# Patient Record
Sex: FEMALE | Race: BLACK OR AFRICAN AMERICAN | Employment: UNEMPLOYED | URBAN - METROPOLITAN AREA
[De-identification: names, ages, dates, MRNs, and addresses within clinical notes are randomized per-mention and may not be internally consistent; named-entity substitution may affect disease eponyms.]

---

## 2017-10-22 PROBLEM — Z79.899 POLYPHARMACY: Status: ACTIVE | Noted: 2017-10-22

## 2017-10-22 PROBLEM — R41.82 ALTERED MENTAL STATUS: Status: ACTIVE | Noted: 2017-10-22

## 2017-10-22 PROBLEM — R09.02 HYPOXIA: Status: ACTIVE | Noted: 2017-10-22

## 2017-10-27 PROBLEM — R31.0 GROSS HEMATURIA: Status: ACTIVE | Noted: 2017-10-27

## 2017-10-27 PROBLEM — N20.0 RENAL CALCULUS, RIGHT: Status: ACTIVE | Noted: 2017-10-27

## 2017-12-14 PROBLEM — T42.4X2A BENZODIAZEPINE OVERDOSE, INTENTIONAL SELF-HARM, INITIAL ENCOUNTER (HCC): Status: ACTIVE | Noted: 2017-12-14

## 2017-12-18 ENCOUNTER — HOSPITAL ENCOUNTER (INPATIENT)
Age: 52
LOS: 1 days | Discharge: LEFT BEFORE DISCHARGE | DRG: 885 | End: 2017-12-19
Attending: STUDENT IN AN ORGANIZED HEALTH CARE EDUCATION/TRAINING PROGRAM | Admitting: STUDENT IN AN ORGANIZED HEALTH CARE EDUCATION/TRAINING PROGRAM
Payer: MEDICARE

## 2017-12-18 PROBLEM — F32.A DEPRESSION: Status: ACTIVE | Noted: 2017-12-18

## 2017-12-18 PROBLEM — R45.89 SUICIDAL BEHAVIOR: Status: ACTIVE | Noted: 2017-12-18

## 2017-12-18 LAB
ANION GAP SERPL CALC-SCNC: 9 MMOL/L (ref 3–18)
BUN SERPL-MCNC: 12 MG/DL (ref 7–18)
BUN/CREAT SERPL: 20 (ref 12–20)
CALCIUM SERPL-MCNC: 8.5 MG/DL (ref 8.5–10.1)
CHLORIDE SERPL-SCNC: 106 MMOL/L (ref 100–108)
CO2 SERPL-SCNC: 25 MMOL/L (ref 21–32)
CREAT SERPL-MCNC: 0.59 MG/DL (ref 0.6–1.3)
GLUCOSE BLD STRIP.AUTO-MCNC: 126 MG/DL (ref 70–110)
GLUCOSE SERPL-MCNC: 122 MG/DL (ref 74–99)
POTASSIUM SERPL-SCNC: 4 MMOL/L (ref 3.5–5.5)
SODIUM SERPL-SCNC: 140 MMOL/L (ref 136–145)

## 2017-12-18 PROCEDURE — 65220000003 HC RM SEMIPRIVATE PSYCH

## 2017-12-18 PROCEDURE — 80048 BASIC METABOLIC PNL TOTAL CA: CPT | Performed by: STUDENT IN AN ORGANIZED HEALTH CARE EDUCATION/TRAINING PROGRAM

## 2017-12-18 PROCEDURE — 74011250637 HC RX REV CODE- 250/637: Performed by: STUDENT IN AN ORGANIZED HEALTH CARE EDUCATION/TRAINING PROGRAM

## 2017-12-18 PROCEDURE — 82962 GLUCOSE BLOOD TEST: CPT

## 2017-12-18 PROCEDURE — 36415 COLL VENOUS BLD VENIPUNCTURE: CPT | Performed by: STUDENT IN AN ORGANIZED HEALTH CARE EDUCATION/TRAINING PROGRAM

## 2017-12-18 RX ORDER — LORAZEPAM 2 MG/ML
1-2 INJECTION INTRAMUSCULAR
Status: DISCONTINUED | OUTPATIENT
Start: 2017-12-18 | End: 2017-12-19 | Stop reason: HOSPADM

## 2017-12-18 RX ORDER — INSULIN LISPRO 100 [IU]/ML
INJECTION, SOLUTION INTRAVENOUS; SUBCUTANEOUS
Status: DISCONTINUED | OUTPATIENT
Start: 2017-12-18 | End: 2017-12-19 | Stop reason: HOSPADM

## 2017-12-18 RX ORDER — TRAZODONE HYDROCHLORIDE 50 MG/1
50 TABLET ORAL
Status: DISCONTINUED | OUTPATIENT
Start: 2017-12-18 | End: 2017-12-19 | Stop reason: HOSPADM

## 2017-12-18 RX ORDER — MAGNESIUM SULFATE 100 %
16 CRYSTALS MISCELLANEOUS AS NEEDED
Status: DISCONTINUED | OUTPATIENT
Start: 2017-12-18 | End: 2017-12-19 | Stop reason: HOSPADM

## 2017-12-18 RX ORDER — IBUPROFEN 600 MG/1
600 TABLET ORAL
Status: DISCONTINUED | OUTPATIENT
Start: 2017-12-18 | End: 2017-12-19 | Stop reason: HOSPADM

## 2017-12-18 RX ORDER — HALOPERIDOL 5 MG/1
5 TABLET ORAL
Status: DISCONTINUED | OUTPATIENT
Start: 2017-12-18 | End: 2017-12-19 | Stop reason: HOSPADM

## 2017-12-18 RX ORDER — IBUPROFEN 200 MG
1 TABLET ORAL DAILY
Status: DISCONTINUED | OUTPATIENT
Start: 2017-12-19 | End: 2017-12-18

## 2017-12-18 RX ORDER — LORAZEPAM 1 MG/1
1-2 TABLET ORAL
Status: DISCONTINUED | OUTPATIENT
Start: 2017-12-18 | End: 2017-12-19 | Stop reason: HOSPADM

## 2017-12-18 RX ORDER — IBUPROFEN 200 MG
1 TABLET ORAL DAILY
Status: DISCONTINUED | OUTPATIENT
Start: 2017-12-18 | End: 2017-12-19 | Stop reason: HOSPADM

## 2017-12-18 RX ORDER — HALOPERIDOL 5 MG/ML
5 INJECTION INTRAMUSCULAR
Status: DISCONTINUED | OUTPATIENT
Start: 2017-12-18 | End: 2017-12-19 | Stop reason: HOSPADM

## 2017-12-18 RX ADMIN — IBUPROFEN 600 MG: 600 TABLET, FILM COATED ORAL at 21:02

## 2017-12-18 RX ADMIN — TRAZODONE HYDROCHLORIDE 50 MG: 50 TABLET ORAL at 21:04

## 2017-12-18 NOTE — IP AVS SNAPSHOT
[address] [place] [address] Patient: Annie Gayle MRN: [id number] IWD:5/50/8537 About your hospitalization You were admitted on:  December 18, 2017 You last received care in the:  [facility] 1 ADULT CHEM DEP You were discharged on:  December 19, 2017 Why you were hospitalized Your primary diagnosis was:  Not on File Your diagnoses also included:  Suicidal Behavior, Depression Things You Need To Do (next 8 weeks) Follow up with Where:  Pt left AMA. Pt encouraged to schedule follow up with provider of her choice Discharge Orders None A check adele indicates which time of day the medication should be taken. My Medications ASK your physician about these medications Instructions Each Dose to Equal  
 Morning Noon Evening Bedtime BD INSULIN SYRINGE ULTRA-FINE 1/2 mL 31 gauge x 15/64\" Syrg Generic drug:  insulin syringe-needle U-100 Your last dose was: Your next dose is:    
   
   
      
   
   
   
  
 ergocalciferol 50,000 unit capsule Commonly known as:  ERGOCALCIFEROL Start taking on:  12/23/2017 Your last dose was: Your next dose is: Take 1 Cap by mouth Every Saturday. [id number] Units  
    
   
   
   
  
 ibuprofen 600 mg tablet Commonly known as:  MOTRIN Your last dose was: Your next dose is: Take 1 Tab by mouth every six (6) hours as needed for Pain. 600 mg JANUVIA 100 mg tablet Generic drug:  SITagliptin Your last dose was: Your next dose is:    
   
   
      
   
   
   
  
 JARDIANCE 25 mg tablet Generic drug:  empagliflozin Your last dose was: Your next dose is:    
   
   
 take 1 tablet by mouth once daily  
     
   
   
   
  
 losartan 50 mg tablet Commonly known as:  COZAAR Your last dose was: Your next dose is:    
   
   
      
   
   
   
  
 magnesium oxide 400 mg tablet Commonly known as:  MAG-OX Your last dose was: Your next dose is: Take 1 Tab by mouth daily. 400 mg  
    
   
   
   
  
 metFORMIN 1,000 mg tablet Commonly known as:  GLUCOPHAGE Your last dose was: Your next dose is:    
   
   
      
   
   
   
  
 nicotine 21 mg/24 hr  
Commonly known as:  Lexa Montane Your last dose was: Your next dose is:    
   
   
 1 Patch by TransDERmal route daily for 30 days. 1 Patch  
    
   
   
   
  
 omeprazole 40 mg capsule Commonly known as:  PRILOSEC Your last dose was: Your next dose is:    
   
   
      
   
   
   
  
 potassium chloride 20 mEq tablet Commonly known as:  K-DUR, KLOR-CON Your last dose was: Your next dose is: Take 1 Tab by mouth daily. 20 mEq QUEtiapine 300 mg tablet Commonly known as:  SEROquel Your last dose was: Your next dose is:    
   
   
 take 2 tablets by mouth at bedtime  
     
   
   
   
  
 simvastatin 40 mg tablet Commonly known as:  ZOCOR Your last dose was: Your next dose is:    
   
   
      
   
   
   
  
 ziprasidone 80 mg capsule Commonly known as:  Mague Masterson Your last dose was: Your next dose is:    
   
   
 take 2 capsules by mouth at bedtime with food Discharge Instructions Pt insisted on leaving treatment and found not detainable by Evaluator from [facility]. No prescriptions provided rt AMA status. [email or web address] Announcement We are excited to announce that we are making your provider's discharge notes available to you in [employer]. You will see these notes when they are completed and signed by the physician that discharged you from your recent hospital stay.   If you have any questions or concerns about any information you see in [employer], please call the Health Information Department where you were seen or reach out to your Primary Care Provider for more information about your plan of care. Introducing [facility]! Dear Wendy Higgins: Thank you for requesting a [employer] account. Our records indicate that you already have an active [employer] account. You can access your account anytime at [email or web address] Did you know that you can access your hospital and ER discharge instructions at any time in [employer]? You can also review all of your test results from your hospital stay or ER visit. Additional Information If you have questions, please visit the Frequently Asked Questions section of the [employer] website at [email or web address] Remember, [employer] is NOT to be used for urgent needs. For medical emergencies, dial 911. Now available from your iPhone and Android! Providers Seen During Your Hospitalization Provider Specialty Primary office phone Darek Ward MD Psychiatry [phone] Your Primary Care Physician (PCP) Primary Care Physician Office Phone Office Fax Aileen Bowles [phone] You are allergic to the following Allergen Reactions Penicillins Swelling Recent Documentation Height Weight BMI Smoking Status 1.702 m 104.3 kg 36.02 kg/m2 Current Every Day Smoker Emergency Contacts Name Discharge Info Relation Home Work Mobile TamBarry DISCHARGE CAREGIVER [3] Spouse [3] [phone] Patient Belongings The following personal items are in your possession at time of discharge: 
  Dental Appliances: None  Visual Aid: None      Home Medications: None   Jewelry: None  Clothing: Jacket/Coat, Pants, Shirt, Footwear    Other Valuables: Wallet, Purse (locker 127/2)  Personal Items Sent to Safe: yes (two bank of va cards) Please provide this summary of care documentation to your next provider. Signatures-by signing, you are acknowledging that this After Visit Summary has been reviewed with you and you have received a copy. Patient Signature:  ____________________________________________________________ Date:  ____________________________________________________________  
  
Paulene Greener Provider Signature:  ____________________________________________________________ Date:  ____________________________________________________________

## 2017-12-18 NOTE — IP AVS SNAPSHOT
[address] [facility] Patient: Annie Gayle MRN: [id number] WBB:5/50/[birth year]43 My Medications ASK your physician about these medications Instructions Each Dose to Equal  
 Morning Noon Evening Bedtime BD INSULIN SYRINGE ULTRA-FINE 1/2 mL 31 gauge x 15/64\" Syrg Generic drug:  insulin syringe-needle U-100 Your last dose was: Your next dose is:    
   
   
      
   
   
   
  
 ergocalciferol 50,000 unit capsule Commonly known as:  ERGOCALCIFEROL Start taking on:  12/23/2017 Your last dose was: Your next dose is: Take 1 Cap by mouth Every Saturday. 18389 Units  
    
   
   
   
  
 ibuprofen 600 mg tablet Commonly known as:  MOTRIN Your last dose was: Your next dose is: Take 1 Tab by mouth every six (6) hours as needed for Pain. 600 mg JANUVIA 100 mg tablet Generic drug:  SITagliptin Your last dose was: Your next dose is:    
   
   
      
   
   
   
  
 JARDIANCE 25 mg tablet Generic drug:  empagliflozin Your last dose was: Your next dose is:    
   
   
 take 1 tablet by mouth once daily  
     
   
   
   
  
 losartan 50 mg tablet Commonly known as:  COZAAR Your last dose was: Your next dose is:    
   
   
      
   
   
   
  
 magnesium oxide 400 mg tablet Commonly known as:  MAG-OX Your last dose was: Your next dose is: Take 1 Tab by mouth daily. 400 mg  
    
   
   
   
  
 metFORMIN 1,000 mg tablet Commonly known as:  GLUCOPHAGE Your last dose was: Your next dose is:    
   
   
      
   
   
   
  
 nicotine 21 mg/24 hr  
Commonly known as:  Barshiraz Sarsofiyaius Your last dose was: Your next dose is:    
   
   
 1 Patch by TransDERmal route daily for 30 days. 1 Patch omeprazole 40 mg capsule Commonly known as:  PRILOSEC Your last dose was: Your next dose is:    
   
   
      
   
   
   
  
 potassium chloride 20 mEq tablet Commonly known as:  K-DUR, KLOR-WILLIAM Your last dose was: Your next dose is: Take 1 Tab by mouth daily. 20 mEq QUEtiapine 300 mg tablet Commonly known as:  SEROquel Your last dose was: Your next dose is:    
   
   
 take 2 tablets by mouth at bedtime  
     
   
   
   
  
 simvastatin 40 mg tablet Commonly known as:  ZOCOR Your last dose was: Your next dose is:    
   
   
      
   
   
   
  
 ziprasidone 80 mg capsule Commonly known as:  Stefano Vo Your last dose was: Your next dose is:    
   
   
 take 2 capsules by mouth at bedtime with food

## 2017-12-18 NOTE — BSMART NOTE
COMPREHENSIVE ASSESSMENT FORM PART 1    SECTION I - DISPOSITION    At [address] hours - Spoke with Terri Alonzo RN assigned to patient on [address] Unit, Room 2466 ([phone]). At [address] hours - Spoke with Rojelio Cal [facility]  ([phone], Pager). The plan is to transfer the patient from the [address] [place] [address] Unit to [facility] Room #094-70 on the Adult/Chemical Dependency Unit. The unit phone is ext. [phone]. Terri Alonzo, the assigned RN to this patient as [facility] has been instructed to have patient transported to the [facility] ER, if patient will be arriving to [facility] [place] after 7pm.    The on-call Psychiatrist consulted was Dr. Brady Doss. The accepting Psychiatrist will be Dr. Mu Bazzi. [facility] unit admitting diagnosis is:  Depression. Suicidal Behavior (Overdose of Lorazepam/Ativan).         ADMISSION ORDERS:  *Routine PRN admission orders  *Sliding scale insulin  *POC Glucose  *Nicoderm Patch  * Creatine Kinase (CK) lab:  Result on 12/18/17 = 352 (Range:   U/L)        Letty Hagen RN, BSN

## 2017-12-19 VITALS
BODY MASS INDEX: 36.1 KG/M2 | HEIGHT: 67 IN | DIASTOLIC BLOOD PRESSURE: 94 MMHG | WEIGHT: 230 LBS | SYSTOLIC BLOOD PRESSURE: 139 MMHG | TEMPERATURE: 99.2 F | RESPIRATION RATE: 18 BRPM | HEART RATE: 88 BPM

## 2017-12-19 LAB — GLUCOSE BLD STRIP.AUTO-MCNC: 158 MG/DL (ref 70–110)

## 2017-12-19 PROCEDURE — 74011250637 HC RX REV CODE- 250/637: Performed by: STUDENT IN AN ORGANIZED HEALTH CARE EDUCATION/TRAINING PROGRAM

## 2017-12-19 PROCEDURE — 82962 GLUCOSE BLOOD TEST: CPT

## 2017-12-19 PROCEDURE — 74011636637 HC RX REV CODE- 636/637: Performed by: STUDENT IN AN ORGANIZED HEALTH CARE EDUCATION/TRAINING PROGRAM

## 2017-12-19 RX ADMIN — INSULIN LISPRO 2 UNITS: 100 INJECTION, SOLUTION INTRAVENOUS; SUBCUTANEOUS at 08:29

## 2017-12-19 RX ADMIN — IBUPROFEN 600 MG: 600 TABLET, FILM COATED ORAL at 08:55

## 2017-12-19 NOTE — BH NOTES
GROUP THERAPY PROGRESS NOTE    Stacy Ugalde is participating in [place]. Group time: 1 hour    Personal goal for participation: rules/ regulations    Goal orientation: community    Group therapy participation: minimal    Therapeutic interventions reviewed and discussed: She was not a management problem during group. Impression of participation: The above pt was quiet during group she was receptive during this group with the rules and regulations during group.

## 2017-12-19 NOTE — BH NOTES
Pt discharged to care of self AMA. Pt denies SI. Pt encouraged to follow up with provider of her choice. After care instructions reviewed with pt who verbalized understanding. Belongings returned.

## 2017-12-19 NOTE — H&P
History and Physical        Patient: Ahskan Rayo               Sex: female          DOA: 12/18/2017         YOB: 1965      Age:  45 y.o.        LOS:  LOS: 1 day        HPI:     Ashkan Rayo is a 45 y.o. female who was admitted experiencing depression, and suicidal ideation with a plan to overdose using medications. Active Problems:    Suicidal behavior (12/18/2017)      Depression (12/18/2017)        Past Medical History:   Diagnosis Date    Bipolar 2 disorder ([facility]     CAD (coronary artery disease)     Diabetes ([facility]     Drug overdose     GERD (gastroesophageal reflux disease)     Hematuria     Hyperlipemia     Insomnia     Kidney stone        No past surgical history on file. Family History   Problem Relation Age of Onset    Cancer Maternal Grandmother      cervical       Social History     Social History    Marital status:      Spouse name: N/A    Number of children: Refused to answer    Years of education: Refused to answer     Social History Main Topics    Smoking status: Current Every Day Smoker     Packs/day: 0.10     Years: 20.00     Types: Cigarettes    Smokeless tobacco: Never Used      Comment: 3 cig per day. will stop dec 25th 2017     Alcohol use No    Drug use: No    Sexual activity: Yes     Partners: Female     Birth control/ protection: None     Other Topics Concern     Service No    Blood Transfusions No    Caffeine Concern No    Occupational Exposure No    Hobby Hazards No    Sleep Concern No    Stress Concern No    Weight Concern No    Special Diet Yes    Back Care No    Exercise No    Bike Helmet No    Seat Belt Yes    Self-Exams No     Social History Narrative       Prior to Admission medications    Medication Sig Start Date End Date Taking? Authorizing Provider   nicotine (NICODERM CQ) 21 mg/24 hr 1 Patch by TransDERmal route daily for 30 days.  12/19/17 1/18/18 Yes Ruffus Paget, MD   ibuprofen (MOTRIN) 600 mg tablet Take 1 Tab by mouth every six (6) hours as needed for Pain. 12/11/17  Yes Gregory Go PA-C   JARDIANCE 25 mg tablet take 1 tablet by mouth once daily 9/7/17  Yes Historical Provider   omeprazole (PRILOSEC) 40 mg capsule  8/22/17  Yes Historical Provider   metFORMIN (GLUCOPHAGE) 1,000 mg tablet  7/27/17  Yes Historical Provider   JANUVIA 100 mg tablet  7/5/17  Yes Historical Provider   ergocalciferol (ERGOCALCIFEROL) 50,000 unit capsule Take 1 Cap by mouth Every Saturday. 12/23/17   Angélica Montero MD   magnesium oxide (MAG-OX) 400 mg tablet Take 1 Tab by mouth daily. 12/18/17   Angélica Montero MD   potassium chloride (K-DUR, KLOR-CON) 20 mEq tablet Take 1 Tab by mouth daily. 12/18/17   Angélica Montero MD   BD INSULIN SYRINGE ULTRA-FINE 1/2 mL 31 gauge x 15/64\" syrg  9/12/17   Historical Provider   QUEtiapine (SEROQUEL) 300 mg tablet take 2 tablets by mouth at bedtime 9/6/17   Historical Provider   ziprasidone (GEODON) 80 mg capsule take 2 capsules by mouth at bedtime with food 8/26/17   Historical Provider   losartan (COZAAR) 50 mg tablet  7/24/17   Historical Provider   simvastatin (ZOCOR) 40 mg tablet  7/24/17   Historical Provider       Allergies   Allergen Reactions    Penicillins Swelling       Review of Systems  A comprehensive review of systems was negative except for: back pain and headache      Physical Exam:      Visit Vitals    BP (!) 139/94 (BP 1 Location: Right arm, BP Patient Position: At rest)    Pulse 88    Temp 99.2 °F (37.3 °C)    Resp 18    Ht 5' 7\" (1.702 m)    Wt 230 lb (104.3 kg)    BMI 36.02 kg/m2       Physical Exam:    General:  Alert, cooperative, well developed, well nourished, obese AA female, no distress, appears stated age. Eyes:  Conjunctivae/corneas clear. PERRL, EOMs intact. Fundi benign   Ears:  Normal TMs and external ear canals both ears. Nose: Nares normal. Septum midline. Mucosa normal. No drainage or sinus tenderness.    Mouth/Throat: Lips, mucosa, and tongue normal. Teeth in disrepair, erratic, dentition poor, and gums normal.   Neck: Supple, symmetrical, trachea midline, no adenopathy, thyroid: no enlargement/tenderness/nodules, no carotid bruit and no JVD. Back:   Symmetric, no curvature. ROM normal. No CVA tenderness. Lungs:   Clear to auscultation bilaterally. Heart:  Regular rate and rhythm, S1, S2 normal, no murmur, click, rub or gallop. Abdomen:   Soft, non-tender. Bowel sounds normal. No masses,  No organomegaly. Extremities: Extremities normal, atraumatic, no cyanosis or edema. Pulses: 2+ and symmetric all extremities. Skin: Skin color, texture, turgor normal. No rashes or lesions   Lymph nodes: Cervical, supraclavicular, and axillary nodes normal.   Neurologic: CNII-XII intact. Normal strength, sensation and reflexes throughout.              Assessment/Plan     Depression  Suicidal ideation  Labs reviewed  Continue treatment per physician's orders

## 2017-12-19 NOTE — BH NOTES
Patient arrived to unit at 2023 via wheelchair accompanied by security and ER staff. Patient denies suicidal ideations despite overdose of Ativan. She denies homicidal and AV hallucinations. Patient stated she didn't know why they brought her here. She stated \"I didn't try to kill myself\". She stated \"I took to many pills and didn't realize it\". Patient cooperative but guarded. Patient stated [facility] doctor told me I only have to stay here three days. This writer explained the treatment process with patient. Patient verbalized understanding. She has a hx of polysubstance, DM and GERD. Nursing will provide a safe and therapeutic environment.

## 2017-12-19 NOTE — DISCHARGE SUMMARY
Psychiatric Discharge Summary    Date: 17   Patient Name: Jose Martin  : 1965  MRN: [id number]  Admission Date: 2017  Discharge Date: 2017    HISTORY OF PRESENT ILLNESS:   Patient is a 46 yo AAF hx of depression, anxiety controlled on medications presents after concerns that she overdosed on Ativan. Pt states that she had no intentions of harming self or ending her life. She says she has never wanted to die and values her life. She says that she \"accidentally\" took \"one too many\" Ativan pills \"too close together\" and thinks she \"got a little out of it\" so was taken to hospital. She has a psychiatrist and has f/u coming up. Denies SI/HI, denies depressive ssx, denies anxiety ssx. No hallucinations or ssx of darinel or psychosis.       HOSPITAL COURSE:   Once on the unit, patient was asking to leave. Stating that this was not a therapeutic environment and she did not want to stay against her will. On day of discharge, patient was asking to leave. She was not suicidal/ homicidal. She was encouraged to stay to manage medications and ensure proper benzodiazepine regimen, but patient refused.      MENTAL STATUS EXAM:  Appearance: Dressed in hospital gown with fair grooming and hygiene  Behavior: Cooperative with good eye contact  Motor: No psychomotor agitation/retardation  Speech: Normal rate, tone and volume  Mood: \"good\"  Affect: euthymic  Thought Process: linear, goal-directed  Thought Content: Denies SI and HI  Perception: Denies AH or VH  Concentration: fair  Memory: fair  Cognition: Alert and oriented  Insight: fair  Judgment: fair    ASSESSMENT at time of discharge:   Not suicidal, not homicidal. No active ssx of depression or anxiety, psychosis or darinel. Diagnoses:  MDD, recurrent, severe w/o PF    Discharge Instructions:  1. Continue psychiatric medications per outpatient provider, not given Rx's at discharge as pt left AMA  2.  Please make all follow up appointments with doctors and , as provided by inpatient behavioral health . 3. If you feel unsafe or begin experiencing suicidal thoughts again, please call 9-1-1 or return to the nearest emergency department. Disposition:  Home with outpatient follow-up      Frances Rueda MD

## 2017-12-19 NOTE — DISCHARGE INSTRUCTIONS
Pt insisted on leaving treatment and found not detainable by Evaluator from [facility]. No prescriptions provided rt AMA status.

## 2017-12-19 NOTE — H&P
Psychiatric Intake Note    Date: 17   Patient Name: Liam Lopez  : 1965  MRN: [id number]  Hospital Day: 2    CC: I don't know why I'm here. HISTORY OF PRESENT ILLNESS:   Patient is a 46 yo AAF hx of depression, anxiety controlled on medications presents after concerns that she overdosed on Ativan. Pt states that she had no intentions of harming self or ending her life. She says she has never wanted to die and values her life. She says that she \"accidentally\" took \"one too many\" Ativan pills \"too close together\" and thinks she \"got a little out of it\" so was taken to hospital. She has a psychiatrist and has f/u coming up. Denies SI/HI, denies depressive ssx, denies anxiety ssx. No hallucinations or ssx of darinel or psychosis.     PSYCHIATRIC HISTORY:  DIAGNOSIS: depression, insomnia  OUTPATIENT PROVIDERS: has psychiatrist  HOSPITALIZATIONS: none  SUICIDE ATTEMPTS: none  CURRENT MEDICATIONS:  Ambien, seroquel, ativan  PRIOR MEDICATIONS: Xanax    PAST MEDICAL/ SURGICAL HISTORY:  Diabetes    ALLERGIESPCN    FAMILY HISTORY OF MENTAL ILLNESS:   Mother side: unknown  Father's side: unknown  Siblings: unknown    SOCIAL HISTORY:  Current Living Situation: lives with  and 34 yo daughter  Relationship status:   Children: 33 yo son, 34 yo daughter  Employment: unemployed, looking for work    SUBSTANCE USE:  Used cocaine 15 yrs ago, no current substance use or alcohol use    REVIEW OF SYSTEMS: reviewed 10 organ systems- negative, except as noted in HPI    [address]:  Appearance: Dressed in hospital gown with fair grooming and hygiene  Behavior: Cooperative with good eye contact  Motor: No psychomotor agitation/retardation  Speech: Normal rate, tone and volume  Mood: \"good\"  Affect: euthymic  Thought Process: linear, goal-directed  Thought Content: Denies SI and HI  Perception: Denies AH or VH  Concentration: fair  Memory: fair  Cognition: Alert and oriented  Insight: fair  Judgment: fair    RISK ASSESSMENT:   Prior Attempts: no noted prior  Lethality of Attempts: none noted prior  Current Ideation/Plan: NO  Weapons at Home: NO  Alcohol/Drug Use: NO  Protective Factors: limited, supportive family  Future Orientation: yes    ASSESSMENT: Not suicidal, not homicidal. Likely not on current med regimen, as her benzodiazepines are being adjusted outpatient, but no acute harm to self or others. No active ssx of depression or anxiety. Diagnoses:  MDD, recurrent, severe, w/o PF    Plan:  Pt wishes to leave [facility].  Ayala Bishop MD

## 2018-03-17 PROBLEM — E87.6 HYPOKALEMIA: Status: ACTIVE | Noted: 2018-03-17

## 2018-03-17 PROBLEM — A41.9 SEPSIS (HCC): Status: ACTIVE | Noted: 2018-03-17

## 2018-03-17 PROBLEM — N17.9 ACUTE KIDNEY INJURY (HCC): Status: ACTIVE | Noted: 2018-03-17

## 2018-04-06 PROBLEM — I50.9 CHF (CONGESTIVE HEART FAILURE) (HCC): Status: ACTIVE | Noted: 2018-04-06

## 2018-08-23 PROBLEM — M54.9 BACK PAIN: Status: ACTIVE | Noted: 2018-08-23
